# Patient Record
Sex: MALE | Race: WHITE
[De-identification: names, ages, dates, MRNs, and addresses within clinical notes are randomized per-mention and may not be internally consistent; named-entity substitution may affect disease eponyms.]

---

## 2017-06-12 ENCOUNTER — HOSPITAL ENCOUNTER (OUTPATIENT)
Dept: HOSPITAL 45 - C.CTS | Age: 47
End: 2017-06-12
Attending: INTERNAL MEDICINE
Payer: COMMERCIAL

## 2017-06-12 DIAGNOSIS — R91.1: Primary | ICD-10-CM

## 2017-06-12 NOTE — DIAGNOSTIC IMAGING REPORT
CHEST CT WITHOUT CONTRAST



CT DOSE: 319.65 mGy.cm



HISTORY: Lung nodule  R91.1 Lung nodule seen on imaging [id number]



TECHNIQUE: Multiaxial CT images of the chest were performed without contrast.



COMPARISON: 12/13/2016



FINDINGS: Scattered pulmonary nodularity unchanged in the prior exam. No new or

interval findings. No significant mediastinal or hilar adenopathy. There are no

focal infiltrative changes.



IMPRESSION: 



1. Stable pulmonary nodularity.

2. Routine follow-up per Fleischner criteria.



Please refer to below summary of Fleischner criteria recommendations for

follow-up of incidental CT nodules (GLENN Barahona, Guidelines for management of

small pulmonary nodules detected on CT scans:  A statement from the [employer]

[employer], Radiology [phone]: [phone].)



SOLID NODULES



Solitary nodule size: <6 mm

*  low risk patients:  no follow-up needed

*  high risk patients:  optional CT at 12 months



Solitary nodule size:  6-8 mm

*  low risk patients:  follow-up at 6-12 months, then consider further follow-up

at 18-24 months

*  high risk patients:  initial follow-up CT at 6-12 months and then at 18-24

months if no change



Solitary nodule size: >8 mm

*  either low or high risk patients - consider follow-up CT at 3 months, and/or

CT-PET, and/or biopsy



Multiple nodules size:  <6 mm

*  low risk patients:  no routine follow-up

*  high risk patients:  optional CT at 12 months



Multiple nodules size:  6-8 mm

*  low risk patients:  follow-up at 3-6 months, then consider further follow-up

at 18-24 months

*  high risk patients:  follow-up at 3-6 months, then at 18-24 months if no

change



Multiple nodules size:  >8 mm

*  low risk patients:  follow-up at 3-6 months, then consider further follow-up

at 18-24 months

*  high risk patients:  follow-up at 3-6 months, then at 18-24 months if no

change



Note:  newly detected indeterminate nodule in persons 35 years of age or older.

*  Low risk patients:  minimal or absent history of smoking and/or other known

risk factors

*  high risk patients:  history of smoking or of other known risk factors (e.g.

first degree relative with lung cancer, or exposure to asbestos, radon, uranium)

*  if a nodule up to 8 mm is partly solid or is ground glass further follow-up

is required after 24 months to exclude possible slow growing adenocarcinoma

(SUYAPA)



SUBSOIL NODULES



Solitary pure ground-glass nodule

*  nodule size <6 mm - no CT follow-up required

*  nodule size >=6 mm - follow-up CT at 6-12 months, then every 2 years until 5

years



Solitary part-solid nodule

*  nodule size <6 mm - no CT follow-up required

*  nodule size >=6 mm - follow-up CT at 3-6 months.  If unchanged, and solid

component remains <6 mm, then annual follow-up for 5 years



Multiple subsolid nodules

*  nodule size <6 mm - follow-up CT at 3-6 months, consider further follow-up at

2 and 4 years if stable

*  nodule size >=6 mm - follow-up CT at 3-6 months, subsequent management based

on the most suspicious nodule(s)







        







Electronically signed by:  Jose Alarcon M.D.

6/12/2017 9:13 AM



Dictated Date/Time:  6/12/2017 9:09 AM

## 2017-08-16 ENCOUNTER — HOSPITAL ENCOUNTER (OUTPATIENT)
Dept: HOSPITAL 45 - C.LABSPEC | Age: 47
Discharge: HOME | End: 2017-08-16
Attending: INTERNAL MEDICINE
Payer: COMMERCIAL

## 2017-08-16 DIAGNOSIS — M70.20: Primary | ICD-10-CM

## 2017-08-16 LAB
APPEARANCE SNV: (no result)
COLOR SNV: (no result)
SYNOVIAL FLUID MONONUC RELAT: 87.3 %
SYNOVIAL FLUID POLYNUC RELAT: 12.7 %

## 2017-12-07 ENCOUNTER — HOSPITAL ENCOUNTER (OUTPATIENT)
Dept: HOSPITAL 45 - C.LAB | Age: 47
Discharge: HOME | End: 2017-12-07
Attending: INTERNAL MEDICINE
Payer: COMMERCIAL

## 2017-12-07 DIAGNOSIS — N52.9: ICD-10-CM

## 2017-12-07 DIAGNOSIS — I70.0: Primary | ICD-10-CM

## 2017-12-07 LAB
ALT SERPL-CCNC: 24 U/L (ref 12–78)
AST SERPL-CCNC: 14 U/L (ref 15–37)
CHOLEST/HDLC SERPL: 3 {RATIO}
GLUCOSE UR QL: 39 MG/DL
KETONES UR QL STRIP: 39 MG/DL
NITRITE UR QL STRIP: 195 MG/DL (ref 0–150)
PH UR: 117 MG/DL (ref 0–200)
VERY LOW DENSITY LIPOPROT CALC: 39 MG/DL

## 2018-01-10 ENCOUNTER — HOSPITAL ENCOUNTER (OUTPATIENT)
Dept: HOSPITAL 45 - C.CTS | Age: 48
Discharge: HOME | End: 2018-01-10
Attending: PHYSICIAN ASSISTANT
Payer: COMMERCIAL

## 2018-01-10 DIAGNOSIS — R91.1: Primary | ICD-10-CM

## 2018-01-10 DIAGNOSIS — Z00.00: ICD-10-CM

## 2018-01-10 NOTE — DIAGNOSTIC IMAGING REPORT
CT SCAN OF THE CHEST WITHOUT IV CONTRAST



CLINICAL HISTORY: Follow-up pulmonary nodule.



COMPARISON STUDY:  Chest CT scans dated 6/12/2017 and 12/13/2016.



TECHNIQUE:  CT scan of the thorax was performed from the thoracic inlet to the

upper abdomen. Images are reviewed in the axial, sagittal, and coronal planes.

IV contrast was not administered for this examination as per the referring

clinician.  A dose lowering technique was utilized adhering to the principles of

ALARA.



CT DOSE: 512.08 mGy.cm



FINDINGS:



Thyroid: Imaged portions of the thyroid gland are normal in size and

attenuation.



Thoracic aorta: The thoracic aorta is normal in caliber and demonstrates

standard 3-vessel arch anatomy.



Heart: The heart is normal in size and without pericardial effusion.



Lungs and pleural spaces: There is no airspace consolidation or pleural

effusion. The trachea and central airways are clear. There are numerous (greater

than 10) pulmonary and pleural-based nodules scattered throughout both lungs.

The largest nodule is seen in the right lower lobe and measures 11 mm. This

nodule abuts the pleura and is seen on image #147. An 8 mm right middle lobe

nodule is seen on image #146, and a 7 mm pleural-based nodule in the left lower

lobe as seen on image #177. Additional smaller nodules are seen in the lingula

on images #163 and #170, the right lower lobe on image #163, and the right upper

lobe on images #91 and #71. These are not significantly changed dating back to

12/13/2016. No new pulmonary lesion is clearly identified. A calcified granuloma

is seen in the left upper lobe.



Mediastinum: There is no mediastinal lymphadenopathy.



Becky: Not well assessed without IV contrast.



Axillae: There is no axillary lymphadenopathy.



Upper abdomen: Partially visualized upper abdominal viscera is within normal

limits.



Skeletal structures: No lytic or blastic bony lesions are seen.





IMPRESSION:



1. There is no airspace consolidation or pleural effusion.



2. There has been no significant change in the size and distribution of numerous

(greater than 10) pathologically indeterminant pulmonary pleural-based nodules

dating back to 12/13/2016. The largest nodule measures up to 11 mm. Continued

follow-up is recommended as per the Fleischner criteria.









Please refer to below summary of Fleischner criteria recommendations for

follow-up of incidental CT nodules (GLENN Barahona, Guidelines for management of

small pulmonary nodules detected on CT scans:  A statement from the [employer]

Society, Radiology 237: [phone].)



SOLID NODULES



Solitary nodule size: <6 mm

*  low risk patients:  no follow-up needed

*  high risk patients:  optional CT at 12 months



Solitary nodule size:  6-8 mm

*  low risk patients:  follow-up at 6-12 months, then consider further follow-up

at 18-24 months

*  high risk patients:  initial follow-up CT at 6-12 months and then at 18-24

months if no change



Solitary nodule size: >8 mm

*  either low or high risk patients - consider follow-up CT at 3 months, and/or

CT-PET, and/or biopsy



Multiple nodules size:  <6 mm

*  low risk patients:  no routine follow-up

*  high risk patients:  optional CT at 12 months



Multiple nodules size:  6-8 mm

*  low risk patients:  follow-up at 3-6 months, then consider further follow-up

at 18-24 months

*  high risk patients:  follow-up at 3-6 months, then at 18-24 months if no

change



Multiple nodules size:  >8 mm

*  low risk patients:  follow-up at 3-6 months, then consider further follow-up

at 18-24 months

*  high risk patients:  follow-up at 3-6 months, then at 18-24 months if no

change



Note:  newly detected indeterminate nodule in persons 35 years of age or older.

*  low risk patients:  minimal or absent history of smoking and/or other known

risk factors

*  high risk patients:  history of smoking or of other known risk factors (e.g.

first degree relative with lung cancer, or exposure to asbestos, radon, uranium)

*  if a nodule up to 8 mm is partly solid or is ground glass further follow-up

is required after 24 months to exclude possible slow growing adenocarcinoma

(SUYAPA)



SUBSOLID NODULES



Solitary pure ground-glass nodule

*  nodule size <6 mm - no CT follow-up required

*  nodule size >=6 mm - follow-up CT at 6-12 months, then every 2 years until 5

years



Solitary part-solid nodule

*  nodule size <6 mm - no CT follow-up required

*  nodule size >=6 mm - follow-up CT at 3-6 months.  If unchanged, and solid

component remains <6 mm, then annual follow-up for 5 years



Multiple subsolid nodules

*  nodule size <6 mm - follow-up CT at 3-6 months, consider further follow-up at

2 and 4 years if stable

*  nodule size >=6 mm - follow-up CT at 3-6 months, subsequent management based

on the most suspicious nodule(s)





       







Electronically signed by:  Bola Arroyo M.D.

1/10/2018 8:23 AM



Dictated Date/Time:  1/10/2018 8:17 AM